# Patient Record
Sex: MALE | Race: WHITE | NOT HISPANIC OR LATINO | Employment: FULL TIME | URBAN - METROPOLITAN AREA
[De-identification: names, ages, dates, MRNs, and addresses within clinical notes are randomized per-mention and may not be internally consistent; named-entity substitution may affect disease eponyms.]

---

## 2021-08-31 ENCOUNTER — APPOINTMENT (OUTPATIENT)
Dept: RADIOLOGY | Facility: CLINIC | Age: 66
End: 2021-08-31
Payer: MEDICARE

## 2021-08-31 VITALS — HEIGHT: 68 IN | WEIGHT: 176.8 LBS | BODY MASS INDEX: 26.8 KG/M2

## 2021-08-31 DIAGNOSIS — M18.11 PRIMARY OSTEOARTHRITIS OF FIRST CARPOMETACARPAL JOINT OF RIGHT HAND: Primary | ICD-10-CM

## 2021-08-31 DIAGNOSIS — M79.644 PAIN OF RIGHT THUMB: ICD-10-CM

## 2021-08-31 PROCEDURE — 99204 OFFICE O/P NEW MOD 45 MIN: CPT | Performed by: ORTHOPAEDIC SURGERY

## 2021-08-31 PROCEDURE — 73140 X-RAY EXAM OF FINGER(S): CPT

## 2021-08-31 RX ORDER — MULTIVITAMIN WITH FOLIC ACID 400 MCG
TABLET ORAL
COMMUNITY

## 2021-08-31 RX ORDER — SILDENAFIL 100 MG/1
TABLET, FILM COATED ORAL
COMMUNITY
Start: 2021-06-23

## 2021-08-31 RX ORDER — LEVOTHYROXINE SODIUM 0.15 MG/1
TABLET ORAL
COMMUNITY

## 2021-08-31 RX ORDER — LISINOPRIL 10 MG/1
10 TABLET ORAL DAILY
COMMUNITY
Start: 2021-06-11

## 2021-08-31 RX ORDER — ROSUVASTATIN CALCIUM 5 MG/1
TABLET, COATED ORAL
COMMUNITY
Start: 2021-06-21

## 2021-08-31 NOTE — PROGRESS NOTES
Assessment/Plan:  1  Primary osteoarthritis of first carpometacarpal joint of right hand  XR thumb right first digit-min 2v    Brace       Scribe Attestation    I,:  Kieth Cheadle am acting as a scribe while in the presence of the attending physician :       I,:  Cathryn Renteria MD personally performed the services described in this documentation    as scribed in my presence :           Pj Talbert upon examination and review of the x-rays of the right hand does demonstrate severe carpometacarpal joint osteoarthritis of the thumb  There is an osteophyte present at the lateral portion of the trapezium as well as mild lateral subluxation of the proximal portion of the metacarpal attributed to the bony prominence on clinical exam   He demonstrates a functional range of motion and strength in all planes on clinical exam today  I did explain that this is a common occurrence in individuals that utilize the hands often throughout the day  I did discuss non operative intervention in the form of bracing, therapy, and steroid injection  He did opt for the [employer] brace today that will be fitted by my   I did also remark that should he failed non operative intervention down the road he could consider surgical intervention the form of a right thumb CMC joint arthroplasty  I did encourage Pj Talbert to continue use of his hand and thumb  Pj Talbert verbalized understanding of all information provided to him today and had no further questions  I will see him back on a as needed basis  Subjective:   Suzanna Cross is a 73 y o  male who presents to the office today for initial evaluation of the right hand  He states that he has been experiencing pain over the past 4 weeks  He states that at that time he did notice a bony prominence at the base of his thumb  He denies any recent traumatic injury to his thumb that may have contributed to deformity    He states that grasping activities as well as pinching can exacerbate his pain  He states that today he is feeling better overall  However, remarks on waxing and waning of his painful symptoms  He denies any swelling to the area of or any significant instability of the thumb  Today he denies any distal paresthesias  Review of Systems   Constitutional: Negative for chills, fever and unexpected weight change  HENT: Negative for hearing loss, nosebleeds and sore throat  Eyes: Negative for pain, redness and visual disturbance  Respiratory: Negative for cough, shortness of breath and wheezing  Cardiovascular: Negative for chest pain, palpitations and leg swelling  Gastrointestinal: Negative for abdominal pain, nausea and vomiting  Endocrine: Negative for polyphagia and polyuria  Genitourinary: Negative for dysuria and hematuria  Musculoskeletal: Positive for arthralgias  See HPI   Skin: Negative for rash and wound  Neurological: Negative for dizziness, numbness and headaches  Psychiatric/Behavioral: Negative for decreased concentration and suicidal ideas  The patient is not nervous/anxious  Past Medical History:   Diagnosis Date    Bradycardia     Disease of thyroid gland     Hypercholesterolemia     Hypertension        Past Surgical History:   Procedure Laterality Date    KNEE SURGERY Left     meniscus repair       Family History   Problem Relation Age of Onset    No Known Problems Mother     No Known Problems Father        Social History     Occupational History    Not on file   Tobacco Use    Smoking status: Never Smoker    Smokeless tobacco: Never Used   Vaping Use    Vaping Use: Never used   Substance and Sexual Activity    Alcohol use:  Yes    Drug use: Never    Sexual activity: Not on file         Current Outpatient Medications:     levothyroxine 150 mcg tablet, Take by mouth, Disp: , Rfl:     lisinopril (ZESTRIL) 10 mg tablet, Take 10 mg by mouth daily, Disp: , Rfl:     Multiple Vitamin (Daily-Chito Multivitamin) TABS, Take by mouth, Disp: , Rfl:     Omega-3 Fatty Acids (Fish Oil) 645 MG CAPS, Take by mouth, Disp: , Rfl:     rosuvastatin (CRESTOR) 5 mg tablet, TAKE 1 TABLET BY MOUTH EVERY DAY GENERIC EQUIVALENT FOR CRESTOR, Disp: , Rfl:     sildenafil (VIAGRA) 100 mg tablet, , Disp: , Rfl:     Allergies   Allergen Reactions    Nulytely With Flavor [Peg 3350-Kcl-Na Bicarb-Nacl] Itching     Pain behind eyes       Objective: There were no vitals filed for this visit  Right Hand Exam     Tenderness   Right hand tenderness location: Mild tenderness at the [facility] joint of the thumb  Range of Motion   Wrist   Extension: 50   Flexion: 90   Pronation: 90   Supination: 90     Muscle Strength   Right wrist normal muscle strength: APB: 5/5, FPL: 5/5 with mild pain  Wrist extension: 5/5   Wrist flexion: 5/5   : 5/5     Other   Erythema: absent  Scars: absent  Sensation: normal  Pulse: present    Comments:  CMC grind test: negative    Bony prominence at the radial aspect of the [facility] joint at the thumb          Strength/Myotome Testing     Right Wrist/Hand   Right wrist normal muscle strength: APB: 5/5, FPL: 5/5 with mild pain  Wrist extension: 5  Wrist flexion: 5      Physical Exam  Vitals reviewed  HENT:      Head: Normocephalic and atraumatic  Eyes:      General:         Right eye: No discharge  Left eye: No discharge  Conjunctiva/sclera: Conjunctivae normal       Pupils: Pupils are equal, round, and reactive to light  Cardiovascular:      Rate and Rhythm: Normal rate  Pulmonary:      Effort: Pulmonary effort is normal  No respiratory distress  Musculoskeletal:      Cervical back: Normal range of motion and neck supple  Comments: As noted in HPI   Skin:     General: Skin is warm and dry  Neurological:      Mental Status: He is alert and oriented to person, place, and time     Psychiatric:         Mood and Affect: Mood normal          Behavior: Behavior normal          I have personally reviewed pertinent films in PACS and my interpretation is as follows:    X-rays of the right hand demonstrate severe carpometacarpal joint osteoarthritis of the thumb  Lateral subluxation of the proximal portion of the 1st metacarpal with a osteophyte laterally of the trapezium

## 2024-03-19 ENCOUNTER — PROBLEM (OUTPATIENT)
Dept: URBAN - METROPOLITAN AREA CLINIC 6 | Facility: CLINIC | Age: 69
End: 2024-03-19

## 2024-03-19 DIAGNOSIS — H35.043: ICD-10-CM

## 2024-03-19 DIAGNOSIS — H35.3132: ICD-10-CM

## 2024-03-19 DIAGNOSIS — H25.813: ICD-10-CM

## 2024-03-19 DIAGNOSIS — H53.8: ICD-10-CM

## 2024-03-19 PROCEDURE — 92004 COMPRE OPH EXAM NEW PT 1/>: CPT

## 2024-03-19 PROCEDURE — 92134 CPTRZ OPH DX IMG PST SGM RTA: CPT

## 2024-03-19 ASSESSMENT — VISUAL ACUITY
OS_CC: J1+
OS_SC: 20/25-1
OD_CC: J1+
OD_OTHER: +2.00 OTC
OD_SC: 20/30+2

## 2024-03-19 ASSESSMENT — TONOMETRY
OD_IOP_MMHG: 13
OS_IOP_MMHG: 11